# Patient Record
Sex: MALE | Race: WHITE | NOT HISPANIC OR LATINO | URBAN - METROPOLITAN AREA
[De-identification: names, ages, dates, MRNs, and addresses within clinical notes are randomized per-mention and may not be internally consistent; named-entity substitution may affect disease eponyms.]

---

## 2017-04-10 ENCOUNTER — OFFICE (OUTPATIENT)
Dept: URBAN - METROPOLITAN AREA CLINIC 10 | Facility: CLINIC | Age: 72
End: 2017-04-10
Payer: COMMERCIAL

## 2017-04-10 VITALS
SYSTOLIC BLOOD PRESSURE: 132 MMHG | HEART RATE: 86 BPM | WEIGHT: 229 LBS | HEIGHT: 74 IN | DIASTOLIC BLOOD PRESSURE: 76 MMHG

## 2017-04-10 DIAGNOSIS — K50.00 CROHN'S DISEASE OF SMALL INTESTINE WITHOUT COMPLICATIONS: ICD-10-CM

## 2017-04-10 DIAGNOSIS — Z86.010 PERSONAL HISTORY OF COLONIC POLYPS: ICD-10-CM

## 2017-04-10 DIAGNOSIS — K52.9 NONINFECTIVE GASTROENTERITIS AND COLITIS, UNSPECIFIED: ICD-10-CM

## 2017-04-10 PROCEDURE — G8427 DOCREV CUR MEDS BY ELIG CLIN: HCPCS | Performed by: INTERNAL MEDICINE

## 2017-04-10 PROCEDURE — 99213 OFFICE O/P EST LOW 20 MIN: CPT | Performed by: INTERNAL MEDICINE

## 2017-04-10 RX ORDER — COLESTIPOL HYDROCHLORIDE 1 G/1
2 TABLET ORAL
Qty: 60 | Refills: 11 | Status: COMPLETED
End: 2018-04-12

## 2017-04-11 LAB
ADALIMUMAB+AB (SERIAL MONITOR): ADALIMUMAB DRUG LEVEL: 0.6 UG/ML
ADALIMUMAB+AB (SERIAL MONITOR): ANTI-ADALIMUMAB ANTIBODY: <25 NG/ML

## 2017-04-14 LAB
C DIFFICLIE TOXIN, PCR: C DIFF TOXIN BY PCR: NOT DETECTED
C DIFFICLIE TOXIN, PCR: SOURCE: (no result)
C-REACTIVE PROTEIN: <0.5 MG/DL
CALPROTECTIN, FECAL: CALPROTECTIN,FECAL: 219 MCG/G — HIGH
CBC COMPLETE BLOOD COUNT W/O DIFF: HEMATOCRIT: 27.8 % — LOW (ref 39–55)
CBC COMPLETE BLOOD COUNT W/O DIFF: HEMOGLOBIN: 8.3 G/DL — LOW (ref 13–17.5)
CBC COMPLETE BLOOD COUNT W/O DIFF: MCH: 19.8 PG — LOW (ref 25–35)
CBC COMPLETE BLOOD COUNT W/O DIFF: MCHC: 29.9 % — LOW (ref 30–38)
CBC COMPLETE BLOOD COUNT W/O DIFF: MCV: 66.3 FL — CRITICAL LOW (ref 78–102)
CBC COMPLETE BLOOD COUNT W/O DIFF: PLATELET COUNT: 232 K/UL (ref 150–450)
CBC COMPLETE BLOOD COUNT W/O DIFF: RBC DISTRIBUTION WIDTH: 18.1 % — HIGH (ref 11.5–16)
CBC COMPLETE BLOOD COUNT W/O DIFF: RED BLOOD CELL COUNT: 4.19 M/UL — LOW (ref 4.3–5.7)
CBC COMPLETE BLOOD COUNT W/O DIFF: WHITE BLOOD CELL COUNT: 7.1 K/UL (ref 4–11)
COMPREHENSIVE METABOLIC PANEL: ALBUMIN: 3.7 G/DL (ref 3.5–5.2)
COMPREHENSIVE METABOLIC PANEL: ALKALINE PHOSPHATASE: 65 U/L (ref 46–118)
COMPREHENSIVE METABOLIC PANEL: CALCIUM TOTAL: 8.7 MG/DL (ref 8.5–10.5)
COMPREHENSIVE METABOLIC PANEL: CARBON DIOXIDE: 28 MEQ/L (ref 21–31)
COMPREHENSIVE METABOLIC PANEL: CHLORIDE: 103 MEQ/L (ref 96–106)
COMPREHENSIVE METABOLIC PANEL: CREATININE: 1.08 MG/DL (ref 0.8–1.4)
COMPREHENSIVE METABOLIC PANEL: FASTING/NON-FASTING: (no result)
COMPREHENSIVE METABOLIC PANEL: GLUCOSE: 92 MG/DL (ref 65–100)
COMPREHENSIVE METABOLIC PANEL: POTASSIUM: 3.7 MEQ/ML (ref 3.5–5.4)
COMPREHENSIVE METABOLIC PANEL: SGOT (AST): 18 U/L (ref 13–40)
COMPREHENSIVE METABOLIC PANEL: SGPT (ALT): 13 U/L (ref 7–52)
COMPREHENSIVE METABOLIC PANEL: SODIUM: 143 MEQ/L (ref 135–145)
COMPREHENSIVE METABOLIC PANEL: TOTAL BILIRUBIN: 0.5 MG/DL (ref 0.3–1.2)
COMPREHENSIVE METABOLIC PANEL: TOTAL PROTEIN: 5.9 G/DL — LOW (ref 6.4–8.3)
COMPREHENSIVE METABOLIC PANEL: UREA NITROGEN: 16 MG/DL (ref 8–23)
QUANTIFERON TB: MITOGEN-NIL: 6.73 IU/ML
QUANTIFERON TB: NIL: 0.01 IU/ML
QUANTIFERON TB: QUANTIFERON TB GOLD: NEGATIVE
QUANTIFERON TB: TB AG-NIL: 0.02 IU/ML

## 2017-06-12 ENCOUNTER — OFFICE (OUTPATIENT)
Dept: URBAN - METROPOLITAN AREA CLINIC 10 | Facility: CLINIC | Age: 72
End: 2017-06-12
Payer: COMMERCIAL

## 2017-06-12 VITALS
HEIGHT: 74 IN | WEIGHT: 233 LBS | DIASTOLIC BLOOD PRESSURE: 68 MMHG | HEART RATE: 86 BPM | SYSTOLIC BLOOD PRESSURE: 135 MMHG

## 2017-06-12 DIAGNOSIS — D50.9 IRON DEFICIENCY ANEMIA, UNSPECIFIED: ICD-10-CM

## 2017-06-12 DIAGNOSIS — K50.00 CROHN'S DISEASE OF SMALL INTESTINE WITHOUT COMPLICATIONS: ICD-10-CM

## 2017-06-12 LAB
CBC COMPLETE BLOOD COUNT W/O DIFF: HEMATOCRIT: 29.9 % — LOW (ref 39–55)
CBC COMPLETE BLOOD COUNT W/O DIFF: HEMOGLOBIN: 8.7 G/DL — LOW (ref 13–17.5)
CBC COMPLETE BLOOD COUNT W/O DIFF: MCH: 19.8 PG — LOW (ref 25–35)
CBC COMPLETE BLOOD COUNT W/O DIFF: MCHC: 29.1 % — LOW (ref 30–38)
CBC COMPLETE BLOOD COUNT W/O DIFF: MCV: 68.1 FL — CRITICAL LOW (ref 78–102)
CBC COMPLETE BLOOD COUNT W/O DIFF: PLATELET COUNT: 263 K/UL (ref 150–450)
CBC COMPLETE BLOOD COUNT W/O DIFF: RBC DISTRIBUTION WIDTH: 19.1 % — HIGH (ref 11.5–16)
CBC COMPLETE BLOOD COUNT W/O DIFF: RED BLOOD CELL COUNT: 4.39 M/UL (ref 4.3–5.7)
CBC COMPLETE BLOOD COUNT W/O DIFF: WHITE BLOOD CELL COUNT: 7.8 K/UL (ref 4–11)
FERRITIN: 7 NG/ML — LOW (ref 30–400)
IRON & TIBC: % SATURATION: 4 % — LOW (ref 20–50)
IRON & TIBC: IRON: 20 UG/DL — LOW (ref 59–158)
IRON & TIBC: TOTAL IRON BINDING: 446 UG/DL — HIGH (ref 112–347)

## 2017-06-12 PROCEDURE — G8427 DOCREV CUR MEDS BY ELIG CLIN: HCPCS | Performed by: INTERNAL MEDICINE

## 2017-06-12 PROCEDURE — 99213 OFFICE O/P EST LOW 20 MIN: CPT | Performed by: INTERNAL MEDICINE

## 2017-07-13 ENCOUNTER — AMBULATORY SURGICAL CENTER (OUTPATIENT)
Dept: URBAN - METROPOLITAN AREA SURGERY 1 | Facility: SURGERY | Age: 72
End: 2017-07-13
Payer: COMMERCIAL

## 2017-07-13 VITALS
HEART RATE: 82 BPM | TEMPERATURE: 97.6 F | HEART RATE: 76 BPM | DIASTOLIC BLOOD PRESSURE: 80 MMHG | OXYGEN SATURATION: 96 % | DIASTOLIC BLOOD PRESSURE: 69 MMHG | TEMPERATURE: 98.3 F | OXYGEN SATURATION: 97 % | DIASTOLIC BLOOD PRESSURE: 53 MMHG | SYSTOLIC BLOOD PRESSURE: 107 MMHG | OXYGEN SATURATION: 94 % | HEART RATE: 82 BPM | RESPIRATION RATE: 14 BRPM | HEART RATE: 74 BPM | HEART RATE: 85 BPM | DIASTOLIC BLOOD PRESSURE: 66 MMHG | SYSTOLIC BLOOD PRESSURE: 85 MMHG | HEIGHT: 74 IN | SYSTOLIC BLOOD PRESSURE: 109 MMHG | RESPIRATION RATE: 14 BRPM | HEART RATE: 73 BPM | OXYGEN SATURATION: 98 % | WEIGHT: 226 LBS | HEART RATE: 76 BPM | HEIGHT: 74 IN | SYSTOLIC BLOOD PRESSURE: 85 MMHG | OXYGEN SATURATION: 96 % | DIASTOLIC BLOOD PRESSURE: 80 MMHG | SYSTOLIC BLOOD PRESSURE: 128 MMHG | RESPIRATION RATE: 15 BRPM | RESPIRATION RATE: 13 BRPM | DIASTOLIC BLOOD PRESSURE: 69 MMHG | HEART RATE: 73 BPM | TEMPERATURE: 98.3 F | TEMPERATURE: 97.6 F | OXYGEN SATURATION: 98 % | DIASTOLIC BLOOD PRESSURE: 66 MMHG | RESPIRATION RATE: 18 BRPM | SYSTOLIC BLOOD PRESSURE: 112 MMHG | SYSTOLIC BLOOD PRESSURE: 128 MMHG | RESPIRATION RATE: 15 BRPM | SYSTOLIC BLOOD PRESSURE: 107 MMHG | HEART RATE: 85 BPM | OXYGEN SATURATION: 94 % | WEIGHT: 226 LBS | RESPIRATION RATE: 13 BRPM | RESPIRATION RATE: 18 BRPM | DIASTOLIC BLOOD PRESSURE: 53 MMHG | SYSTOLIC BLOOD PRESSURE: 112 MMHG | SYSTOLIC BLOOD PRESSURE: 109 MMHG | HEART RATE: 74 BPM | OXYGEN SATURATION: 97 %

## 2017-07-13 DIAGNOSIS — D50.9 IRON DEFICIENCY ANEMIA, UNSPECIFIED: ICD-10-CM

## 2017-07-13 PROCEDURE — 43235 EGD DIAGNOSTIC BRUSH WASH: CPT | Performed by: INTERNAL MEDICINE

## 2017-07-13 PROCEDURE — G8907 PT DOC NO EVENTS ON DISCHARG: HCPCS | Performed by: INTERNAL MEDICINE

## 2017-07-13 PROCEDURE — G8918 PT W/O PREOP ORDER IV AB PRO: HCPCS | Performed by: INTERNAL MEDICINE

## 2017-08-16 ENCOUNTER — AMBULATORY SURGICAL CENTER (OUTPATIENT)
Dept: URBAN - METROPOLITAN AREA SURGERY 1 | Facility: SURGERY | Age: 72
End: 2017-08-16

## 2017-08-16 ENCOUNTER — AMBULATORY SURGICAL CENTER (OUTPATIENT)
Dept: URBAN - METROPOLITAN AREA SURGERY 1 | Facility: SURGERY | Age: 72
End: 2017-08-16
Payer: COMMERCIAL

## 2017-08-16 ENCOUNTER — OFFICE (OUTPATIENT)
Dept: URBAN - METROPOLITAN AREA CLINIC 11 | Facility: CLINIC | Age: 72
End: 2017-08-16
Payer: COMMERCIAL

## 2017-08-16 VITALS
SYSTOLIC BLOOD PRESSURE: 118 MMHG | HEART RATE: 66 BPM | DIASTOLIC BLOOD PRESSURE: 51 MMHG | HEART RATE: 63 BPM | RESPIRATION RATE: 14 BRPM | SYSTOLIC BLOOD PRESSURE: 115 MMHG | HEART RATE: 60 BPM | WEIGHT: 231 LBS | HEIGHT: 74 IN | DIASTOLIC BLOOD PRESSURE: 38 MMHG | SYSTOLIC BLOOD PRESSURE: 118 MMHG | HEART RATE: 63 BPM | SYSTOLIC BLOOD PRESSURE: 98 MMHG | HEART RATE: 68 BPM | SYSTOLIC BLOOD PRESSURE: 113 MMHG | DIASTOLIC BLOOD PRESSURE: 38 MMHG | SYSTOLIC BLOOD PRESSURE: 107 MMHG | SYSTOLIC BLOOD PRESSURE: 99 MMHG | RESPIRATION RATE: 16 BRPM | DIASTOLIC BLOOD PRESSURE: 66 MMHG | TEMPERATURE: 98.5 F | DIASTOLIC BLOOD PRESSURE: 73 MMHG | HEART RATE: 72 BPM | DIASTOLIC BLOOD PRESSURE: 67 MMHG | DIASTOLIC BLOOD PRESSURE: 63 MMHG | DIASTOLIC BLOOD PRESSURE: 66 MMHG | SYSTOLIC BLOOD PRESSURE: 107 MMHG | SYSTOLIC BLOOD PRESSURE: 90 MMHG | RESPIRATION RATE: 14 BRPM | DIASTOLIC BLOOD PRESSURE: 50 MMHG | HEART RATE: 65 BPM | OXYGEN SATURATION: 96 % | DIASTOLIC BLOOD PRESSURE: 63 MMHG | DIASTOLIC BLOOD PRESSURE: 51 MMHG | OXYGEN SATURATION: 97 % | SYSTOLIC BLOOD PRESSURE: 99 MMHG | RESPIRATION RATE: 16 BRPM | HEART RATE: 72 BPM | SYSTOLIC BLOOD PRESSURE: 90 MMHG | HEART RATE: 60 BPM | SYSTOLIC BLOOD PRESSURE: 115 MMHG | OXYGEN SATURATION: 95 % | OXYGEN SATURATION: 97 % | DIASTOLIC BLOOD PRESSURE: 67 MMHG | TEMPERATURE: 98.5 F | DIASTOLIC BLOOD PRESSURE: 50 MMHG | SYSTOLIC BLOOD PRESSURE: 98 MMHG | TEMPERATURE: 97.6 F | HEART RATE: 65 BPM | HEIGHT: 74 IN | WEIGHT: 231 LBS | OXYGEN SATURATION: 96 % | HEART RATE: 87 BPM | SYSTOLIC BLOOD PRESSURE: 113 MMHG | TEMPERATURE: 97.6 F | DIASTOLIC BLOOD PRESSURE: 73 MMHG | HEART RATE: 87 BPM | HEART RATE: 66 BPM | HEART RATE: 68 BPM | OXYGEN SATURATION: 95 %

## 2017-08-16 DIAGNOSIS — K51.40 INFLAMMATORY POLYPS OF COLON WITHOUT COMPLICATIONS: ICD-10-CM

## 2017-08-16 DIAGNOSIS — K92.2 GASTROINTESTINAL HEMORRHAGE, UNSPECIFIED: ICD-10-CM

## 2017-08-16 DIAGNOSIS — K57.30 DIVERTICULOSIS OF LARGE INTESTINE WITHOUT PERFORATION OR ABS: ICD-10-CM

## 2017-08-16 DIAGNOSIS — K50.00 CROHN'S DISEASE OF SMALL INTESTINE WITHOUT COMPLICATIONS: ICD-10-CM

## 2017-08-16 DIAGNOSIS — K56.69 OTHER INTESTINAL OBSTRUCTION: ICD-10-CM

## 2017-08-16 PROCEDURE — 45380 COLONOSCOPY AND BIOPSY: CPT | Performed by: INTERNAL MEDICINE

## 2017-08-16 PROCEDURE — G8907 PT DOC NO EVENTS ON DISCHARG: HCPCS | Performed by: INTERNAL MEDICINE

## 2017-08-16 PROCEDURE — 88342 IMHCHEM/IMCYTCHM 1ST ANTB: CPT | Performed by: INTERNAL MEDICINE

## 2017-08-16 PROCEDURE — G8918 PT W/O PREOP ORDER IV AB PRO: HCPCS | Performed by: INTERNAL MEDICINE

## 2017-08-16 PROCEDURE — 88341 IMHCHEM/IMCYTCHM EA ADD ANTB: CPT | Performed by: INTERNAL MEDICINE

## 2017-08-16 PROCEDURE — 88305 TISSUE EXAM BY PATHOLOGIST: CPT | Performed by: INTERNAL MEDICINE

## 2017-08-16 NOTE — SERVICENOTES
Alerts:
 CO2 insufflation used

Intra-Procedure Room:
 Procedure Room 2

Post-Procedure Room:
 8

Pre-Procedure Room:
 8

## 2017-09-07 ENCOUNTER — OFFICE (OUTPATIENT)
Dept: URBAN - METROPOLITAN AREA CLINIC 10 | Facility: CLINIC | Age: 72
End: 2017-09-07

## 2017-11-06 ENCOUNTER — OFFICE (OUTPATIENT)
Dept: URBAN - METROPOLITAN AREA CLINIC 10 | Facility: CLINIC | Age: 72
End: 2017-11-06

## 2017-11-06 VITALS
HEIGHT: 74 IN | HEART RATE: 90 BPM | WEIGHT: 232 LBS | SYSTOLIC BLOOD PRESSURE: 116 MMHG | DIASTOLIC BLOOD PRESSURE: 64 MMHG

## 2017-11-06 DIAGNOSIS — K50.00 CROHN'S DISEASE OF SMALL INTESTINE WITHOUT COMPLICATIONS: ICD-10-CM

## 2017-11-06 PROCEDURE — 99213 OFFICE O/P EST LOW 20 MIN: CPT | Performed by: INTERNAL MEDICINE

## 2017-11-06 PROCEDURE — G8427 DOCREV CUR MEDS BY ELIG CLIN: HCPCS | Performed by: INTERNAL MEDICINE

## 2017-11-08 ENCOUNTER — OFFICE (OUTPATIENT)
Dept: URBAN - METROPOLITAN AREA CLINIC 10 | Facility: CLINIC | Age: 72
End: 2017-11-08

## 2017-11-08 LAB — CALPROTECTIN, FECAL: 210 UG/G — HIGH (ref 0–120)

## 2018-04-12 ENCOUNTER — OFFICE (OUTPATIENT)
Dept: URBAN - METROPOLITAN AREA CLINIC 10 | Facility: CLINIC | Age: 73
End: 2018-04-12

## 2018-04-12 VITALS
HEIGHT: 74 IN | DIASTOLIC BLOOD PRESSURE: 62 MMHG | WEIGHT: 238 LBS | HEART RATE: 88 BPM | SYSTOLIC BLOOD PRESSURE: 123 MMHG

## 2018-04-12 DIAGNOSIS — K50.00 CROHN'S DISEASE OF SMALL INTESTINE WITHOUT COMPLICATIONS: ICD-10-CM

## 2018-04-12 DIAGNOSIS — D50.9 IRON DEFICIENCY ANEMIA, UNSPECIFIED: ICD-10-CM

## 2018-04-12 LAB
C-REACTIVE PROTEIN, QUANT: 0.5 MG/L (ref 0–4.9)
CBC, PLATELET, NO DIFFERENTIAL: HEMATOCRIT: 26.9 % — LOW (ref 37.5–51)
CBC, PLATELET, NO DIFFERENTIAL: HEMOGLOBIN: 7.7 G/DL — LOW (ref 13–17.7)
CBC, PLATELET, NO DIFFERENTIAL: MCH: 19.5 PG — LOW (ref 26.6–33)
CBC, PLATELET, NO DIFFERENTIAL: MCHC: 28.6 G/DL — LOW (ref 31.5–35.7)
CBC, PLATELET, NO DIFFERENTIAL: MCV: 68 FL — LOW (ref 79–97)
CBC, PLATELET, NO DIFFERENTIAL: PLATELETS: 197 X10E3/UL (ref 150–379)
CBC, PLATELET, NO DIFFERENTIAL: RBC: 3.94 X10E6/UL — LOW (ref 4.14–5.8)
CBC, PLATELET, NO DIFFERENTIAL: RDW: 16.3 % — HIGH (ref 12.3–15.4)
CBC, PLATELET, NO DIFFERENTIAL: WBC: 8.3 X10E3/UL (ref 3.4–10.8)

## 2018-04-12 PROCEDURE — 99213 OFFICE O/P EST LOW 20 MIN: CPT | Performed by: INTERNAL MEDICINE

## 2018-05-18 ENCOUNTER — OFFICE (OUTPATIENT)
Dept: URBAN - METROPOLITAN AREA CLINIC 10 | Facility: CLINIC | Age: 73
End: 2018-05-18

## 2018-10-12 ENCOUNTER — OFFICE (OUTPATIENT)
Dept: URBAN - METROPOLITAN AREA CLINIC 10 | Facility: CLINIC | Age: 73
End: 2018-10-12

## 2018-10-12 VITALS
DIASTOLIC BLOOD PRESSURE: 89 MMHG | SYSTOLIC BLOOD PRESSURE: 137 MMHG | WEIGHT: 236 LBS | HEART RATE: 89 BPM | HEIGHT: 74 IN

## 2018-10-12 DIAGNOSIS — D50.9 IRON DEFICIENCY ANEMIA, UNSPECIFIED: ICD-10-CM

## 2018-10-12 DIAGNOSIS — K50.00 CROHN'S DISEASE OF SMALL INTESTINE WITHOUT COMPLICATIONS: ICD-10-CM

## 2018-10-12 DIAGNOSIS — Z79.01 LONG TERM (CURRENT) USE OF ANTICOAGULANTS: ICD-10-CM

## 2018-10-12 PROCEDURE — 99213 OFFICE O/P EST LOW 20 MIN: CPT | Performed by: INTERNAL MEDICINE

## 2020-02-24 ENCOUNTER — OFFICE (OUTPATIENT)
Dept: URBAN - METROPOLITAN AREA CLINIC 10 | Facility: CLINIC | Age: 75
End: 2020-02-24

## 2020-02-24 VITALS
SYSTOLIC BLOOD PRESSURE: 108 MMHG | DIASTOLIC BLOOD PRESSURE: 72 MMHG | HEART RATE: 89 BPM | WEIGHT: 215 LBS | HEIGHT: 74 IN

## 2020-02-24 DIAGNOSIS — R10.9 UNSPECIFIED ABDOMINAL PAIN: ICD-10-CM

## 2020-02-24 DIAGNOSIS — K50.90 CROHN'S DISEASE, UNSPECIFIED, WITHOUT COMPLICATIONS: ICD-10-CM

## 2020-02-24 LAB
C-REACTIVE PROTEIN, QUANT: 2 MG/L (ref 0–10)
COMP. METABOLIC PANEL (14): A/G RATIO: 1.5 (ref 1.2–2.2)
COMP. METABOLIC PANEL (14): ALBUMIN: 3.5 G/DL — LOW (ref 3.7–4.7)
COMP. METABOLIC PANEL (14): ALKALINE PHOSPHATASE: 87 IU/L (ref 39–117)
COMP. METABOLIC PANEL (14): ALT (SGPT): 12 IU/L (ref 0–44)
COMP. METABOLIC PANEL (14): AST (SGOT): 15 IU/L (ref 0–40)
COMP. METABOLIC PANEL (14): BILIRUBIN, TOTAL: 0.7 MG/DL (ref 0–1.2)
COMP. METABOLIC PANEL (14): BUN/CREATININE RATIO: 15 (ref 10–24)
COMP. METABOLIC PANEL (14): BUN: 15 MG/DL (ref 8–27)
COMP. METABOLIC PANEL (14): CALCIUM: 9.3 MG/DL (ref 8.6–10.2)
COMP. METABOLIC PANEL (14): CARBON DIOXIDE, TOTAL: 23 MMOL/L (ref 20–29)
COMP. METABOLIC PANEL (14): CHLORIDE: 104 MMOL/L (ref 96–106)
COMP. METABOLIC PANEL (14): CREATININE: 1.02 MG/DL (ref 0.76–1.27)
COMP. METABOLIC PANEL (14): EGFR IF AFRICN AM: 83 ML/MIN/1.73 (ref 59–?)
COMP. METABOLIC PANEL (14): EGFR IF NONAFRICN AM: 72 ML/MIN/1.73 (ref 59–?)
COMP. METABOLIC PANEL (14): GLOBULIN, TOTAL: 2.3 G/DL (ref 1.5–4.5)
COMP. METABOLIC PANEL (14): GLUCOSE: 85 MG/DL (ref 65–99)
COMP. METABOLIC PANEL (14): POTASSIUM: 4 MMOL/L (ref 3.5–5.2)
COMP. METABOLIC PANEL (14): PROTEIN, TOTAL: 5.8 G/DL — LOW (ref 6–8.5)
COMP. METABOLIC PANEL (14): SODIUM: 145 MMOL/L — HIGH (ref 134–144)
HBSAG SCREEN: NEGATIVE
QUANTIFERON-TB GOLD PLUS: NEGATIVE
QUANTIFERON-TB GOLD PLUS: QUANTIFERON CRITERIA: (no result)
QUANTIFERON-TB GOLD PLUS: QUANTIFERON INCUBATION: (no result)
QUANTIFERON-TB GOLD PLUS: QUANTIFERON MITOGEN VALUE: 6.23 IU/ML
QUANTIFERON-TB GOLD PLUS: QUANTIFERON NIL VALUE: 0.02 IU/ML
QUANTIFERON-TB GOLD PLUS: QUANTIFERON TB1 AG VALUE: 0.02 IU/ML
QUANTIFERON-TB GOLD PLUS: QUANTIFERON TB2 AG VALUE: 0.02 IU/ML

## 2020-02-24 PROCEDURE — 99213 OFFICE O/P EST LOW 20 MIN: CPT | Performed by: INTERNAL MEDICINE

## 2020-03-19 ENCOUNTER — OFFICE (OUTPATIENT)
Dept: URBAN - METROPOLITAN AREA CLINIC 11 | Facility: CLINIC | Age: 75
End: 2020-03-19

## 2020-03-19 DIAGNOSIS — K50.90 CROHN'S DISEASE, UNSPECIFIED, WITHOUT COMPLICATIONS: ICD-10-CM

## 2020-03-19 PROCEDURE — 96365 THER/PROPH/DIAG IV INF INIT: CPT | Performed by: INTERNAL MEDICINE

## 2020-05-11 ENCOUNTER — OFFICE (OUTPATIENT)
Dept: URBAN - METROPOLITAN AREA CLINIC 11 | Facility: CLINIC | Age: 75
End: 2020-05-11

## 2020-05-11 DIAGNOSIS — K50.90 CROHN'S DISEASE, UNSPECIFIED, WITHOUT COMPLICATIONS: ICD-10-CM

## 2020-05-11 PROCEDURE — 96401 CHEMO ANTI-NEOPL SQ/IM: CPT | Performed by: INTERNAL MEDICINE

## 2020-07-09 ENCOUNTER — OFFICE (OUTPATIENT)
Dept: URBAN - METROPOLITAN AREA CLINIC 11 | Facility: CLINIC | Age: 75
End: 2020-07-09

## 2020-07-09 DIAGNOSIS — K50.90 CROHN'S DISEASE, UNSPECIFIED, WITHOUT COMPLICATIONS: ICD-10-CM

## 2020-07-09 PROCEDURE — 96401 CHEMO ANTI-NEOPL SQ/IM: CPT | Performed by: INTERNAL MEDICINE

## 2020-09-14 ENCOUNTER — OFFICE (OUTPATIENT)
Dept: URBAN - METROPOLITAN AREA CLINIC 11 | Facility: CLINIC | Age: 75
End: 2020-09-14

## 2020-09-14 DIAGNOSIS — K50.90 CROHN'S DISEASE, UNSPECIFIED, WITHOUT COMPLICATIONS: ICD-10-CM

## 2020-09-14 PROCEDURE — 96401 CHEMO ANTI-NEOPL SQ/IM: CPT | Performed by: INTERNAL MEDICINE

## 2020-11-19 ENCOUNTER — OFFICE (OUTPATIENT)
Dept: URBAN - METROPOLITAN AREA CLINIC 11 | Facility: CLINIC | Age: 75
End: 2020-11-19

## 2020-11-19 DIAGNOSIS — K50.90 CROHN'S DISEASE, UNSPECIFIED, WITHOUT COMPLICATIONS: ICD-10-CM

## 2020-11-19 PROCEDURE — 96401 CHEMO ANTI-NEOPL SQ/IM: CPT | Performed by: INTERNAL MEDICINE

## 2021-01-22 ENCOUNTER — OFFICE (OUTPATIENT)
Dept: URBAN - METROPOLITAN AREA CLINIC 10 | Facility: CLINIC | Age: 76
End: 2021-01-22

## 2021-01-22 VITALS — HEIGHT: 74 IN

## 2021-01-22 DIAGNOSIS — K50.90 CROHN'S DISEASE, UNSPECIFIED, WITHOUT COMPLICATIONS: ICD-10-CM

## 2021-01-22 PROCEDURE — 96372 THER/PROPH/DIAG INJ SC/IM: CPT | Performed by: INTERNAL MEDICINE

## 2021-01-27 ENCOUNTER — OFFICE (OUTPATIENT)
Dept: URBAN - METROPOLITAN AREA CLINIC 10 | Facility: CLINIC | Age: 76
End: 2021-01-27

## 2021-01-27 VITALS
SYSTOLIC BLOOD PRESSURE: 145 MMHG | DIASTOLIC BLOOD PRESSURE: 85 MMHG | HEART RATE: 93 BPM | HEIGHT: 74 IN | WEIGHT: 223 LBS

## 2021-01-27 DIAGNOSIS — K50.90 CROHN'S DISEASE, UNSPECIFIED, WITHOUT COMPLICATIONS: ICD-10-CM

## 2021-01-27 DIAGNOSIS — R10.9 UNSPECIFIED ABDOMINAL PAIN: ICD-10-CM

## 2021-01-27 DIAGNOSIS — D50.9 IRON DEFICIENCY ANEMIA, UNSPECIFIED: ICD-10-CM

## 2021-01-27 DIAGNOSIS — R12 HEARTBURN: ICD-10-CM

## 2021-01-27 LAB
C-REACTIVE PROTEIN, QUANT: <1 MG/L
HBSAG SCREEN: NEGATIVE
QUANTIFERON-TB GOLD PLUS: NEGATIVE
QUANTIFERON-TB GOLD PLUS: QUANTIFERON CRITERIA: (no result)
QUANTIFERON-TB GOLD PLUS: QUANTIFERON INCUBATION: (no result)
QUANTIFERON-TB GOLD PLUS: QUANTIFERON MITOGEN VALUE: 0.94 IU/ML
QUANTIFERON-TB GOLD PLUS: QUANTIFERON NIL VALUE: 0.05 IU/ML
QUANTIFERON-TB GOLD PLUS: QUANTIFERON TB1 AG VALUE: 0.05 IU/ML
QUANTIFERON-TB GOLD PLUS: QUANTIFERON TB2 AG VALUE: 0.06 IU/ML

## 2021-01-27 PROCEDURE — 99214 OFFICE O/P EST MOD 30 MIN: CPT | Performed by: PHYSICIAN ASSISTANT

## 2021-01-27 RX ORDER — POLYETHYLENE GLYCOL 3350, SODIUM SULFATE, SODIUM CHLORIDE, POTASSIUM CHLORIDE, ASCORBIC ACID, SODIUM ASCORBATE 140-9-5.2G
KIT ORAL
Qty: 1 | Refills: 0 | Status: COMPLETED
Start: 2021-01-27 | End: 2023-06-01

## 2021-03-25 ENCOUNTER — OFFICE (OUTPATIENT)
Dept: URBAN - METROPOLITAN AREA CLINIC 10 | Facility: CLINIC | Age: 76
End: 2021-03-25

## 2021-03-25 DIAGNOSIS — K50.90 CROHN'S DISEASE, UNSPECIFIED, WITHOUT COMPLICATIONS: ICD-10-CM

## 2021-03-25 PROCEDURE — 96372 THER/PROPH/DIAG INJ SC/IM: CPT | Performed by: INTERNAL MEDICINE

## 2021-06-01 ENCOUNTER — OFFICE (OUTPATIENT)
Dept: URBAN - METROPOLITAN AREA CLINIC 10 | Facility: CLINIC | Age: 76
End: 2021-06-01

## 2021-06-01 DIAGNOSIS — K50.90 CROHN'S DISEASE, UNSPECIFIED, WITHOUT COMPLICATIONS: ICD-10-CM

## 2021-06-01 PROCEDURE — 96372 THER/PROPH/DIAG INJ SC/IM: CPT | Performed by: INTERNAL MEDICINE

## 2021-08-03 ENCOUNTER — OFFICE (OUTPATIENT)
Dept: URBAN - METROPOLITAN AREA CLINIC 10 | Facility: CLINIC | Age: 76
End: 2021-08-03
Payer: COMMERCIAL

## 2021-08-03 VITALS — HEIGHT: 74 IN

## 2021-08-03 DIAGNOSIS — K50.90 CROHN'S DISEASE, UNSPECIFIED, WITHOUT COMPLICATIONS: ICD-10-CM

## 2021-08-03 PROCEDURE — 96372 THER/PROPH/DIAG INJ SC/IM: CPT | Performed by: INTERNAL MEDICINE

## 2021-10-06 ENCOUNTER — OFFICE (OUTPATIENT)
Dept: URBAN - METROPOLITAN AREA CLINIC 10 | Facility: CLINIC | Age: 76
End: 2021-10-06
Payer: COMMERCIAL

## 2021-10-06 DIAGNOSIS — K50.90 CROHN'S DISEASE, UNSPECIFIED, WITHOUT COMPLICATIONS: ICD-10-CM

## 2021-10-06 PROCEDURE — 96372 THER/PROPH/DIAG INJ SC/IM: CPT | Performed by: INTERNAL MEDICINE

## 2023-06-01 ENCOUNTER — OFFICE (OUTPATIENT)
Dept: URBAN - METROPOLITAN AREA CLINIC 10 | Facility: CLINIC | Age: 78
End: 2023-06-01
Payer: COMMERCIAL

## 2023-06-01 VITALS
HEART RATE: 89 BPM | SYSTOLIC BLOOD PRESSURE: 132 MMHG | WEIGHT: 211 LBS | DIASTOLIC BLOOD PRESSURE: 78 MMHG | HEIGHT: 73 IN | OXYGEN SATURATION: 96 %

## 2023-06-01 DIAGNOSIS — K50.90 CROHN'S DISEASE, UNSPECIFIED, WITHOUT COMPLICATIONS: ICD-10-CM

## 2023-06-01 PROCEDURE — 99214 OFFICE O/P EST MOD 30 MIN: CPT | Performed by: INTERNAL MEDICINE

## 2023-06-01 RX ORDER — POLYETHYLENE GLYCOL 3350, SODIUM SULFATE, SODIUM CHLORIDE, POTASSIUM CHLORIDE, ASCORBIC ACID, SODIUM ASCORBATE 140-9-5.2G
KIT ORAL
Qty: 1 | Refills: 0 | Status: ACTIVE
Start: 2023-06-01

## 2023-06-03 LAB
CBC, PLATELET, NO DIFFERENTIAL: HEMATOCRIT: 37.9 % (ref 37.5–51)
CBC, PLATELET, NO DIFFERENTIAL: HEMOGLOBIN: 12 G/DL — LOW (ref 13–17.7)
CBC, PLATELET, NO DIFFERENTIAL: MCH: 28.4 PG (ref 26.6–33)
CBC, PLATELET, NO DIFFERENTIAL: MCHC: 31.7 G/DL (ref 31.5–35.7)
CBC, PLATELET, NO DIFFERENTIAL: MCV: 90 FL (ref 79–97)
CBC, PLATELET, NO DIFFERENTIAL: PLATELETS: 171 X10E3/UL (ref 150–450)
CBC, PLATELET, NO DIFFERENTIAL: RBC: 4.22 X10E6/UL (ref 4.14–5.8)
CBC, PLATELET, NO DIFFERENTIAL: RDW: 13.8 % (ref 11.6–15.4)
CBC, PLATELET, NO DIFFERENTIAL: WBC: 9.2 X10E3/UL (ref 3.4–10.8)
COMP. METABOLIC PANEL (14): A/G RATIO: 1.6 (ref 1.2–2.2)
COMP. METABOLIC PANEL (14): ALBUMIN: 3.5 G/DL — LOW (ref 3.7–4.7)
COMP. METABOLIC PANEL (14): ALKALINE PHOSPHATASE: 77 IU/L (ref 44–121)
COMP. METABOLIC PANEL (14): ALT (SGPT): 26 IU/L (ref 0–44)
COMP. METABOLIC PANEL (14): AST (SGOT): 27 IU/L (ref 0–40)
COMP. METABOLIC PANEL (14): BILIRUBIN, TOTAL: 1 MG/DL (ref 0–1.2)
COMP. METABOLIC PANEL (14): BUN/CREATININE RATIO: 13 (ref 10–24)
COMP. METABOLIC PANEL (14): BUN: 13 MG/DL (ref 8–27)
COMP. METABOLIC PANEL (14): CALCIUM: 9 MG/DL (ref 8.6–10.2)
COMP. METABOLIC PANEL (14): CARBON DIOXIDE, TOTAL: 25 MMOL/L (ref 20–29)
COMP. METABOLIC PANEL (14): CHLORIDE: 103 MMOL/L (ref 96–106)
COMP. METABOLIC PANEL (14): CREATININE: 1.01 MG/DL (ref 0.76–1.27)
COMP. METABOLIC PANEL (14): EGFR: 76 ML/MIN/1.73 (ref 59–?)
COMP. METABOLIC PANEL (14): GLOBULIN, TOTAL: 2.2 G/DL (ref 1.5–4.5)
COMP. METABOLIC PANEL (14): GLUCOSE: 85 MG/DL (ref 70–99)
COMP. METABOLIC PANEL (14): POTASSIUM: 4 MMOL/L (ref 3.5–5.2)
COMP. METABOLIC PANEL (14): PROTEIN, TOTAL: 5.7 G/DL — LOW (ref 6–8.5)
COMP. METABOLIC PANEL (14): SODIUM: 142 MMOL/L (ref 134–144)
QUANTIFERON-TB GOLD PLUS: NEGATIVE
QUANTIFERON-TB GOLD PLUS: QUANTIFERON CRITERIA: (no result)
QUANTIFERON-TB GOLD PLUS: QUANTIFERON INCUBATION: (no result)
QUANTIFERON-TB GOLD PLUS: QUANTIFERON MITOGEN VALUE: >10 IU/ML
QUANTIFERON-TB GOLD PLUS: QUANTIFERON NIL VALUE: 0 IU/ML
QUANTIFERON-TB GOLD PLUS: QUANTIFERON TB1 AG VALUE: 0 IU/ML
QUANTIFERON-TB GOLD PLUS: QUANTIFERON TB2 AG VALUE: 0 IU/ML

## 2025-04-02 ENCOUNTER — OFFICE (OUTPATIENT)
Dept: URBAN - METROPOLITAN AREA CLINIC 10 | Facility: CLINIC | Age: 80
End: 2025-04-02
Payer: MEDICARE

## 2025-04-02 VITALS
HEIGHT: 73 IN | HEART RATE: 103 BPM | DIASTOLIC BLOOD PRESSURE: 66 MMHG | OXYGEN SATURATION: 96 % | SYSTOLIC BLOOD PRESSURE: 123 MMHG | WEIGHT: 231 LBS

## 2025-04-02 DIAGNOSIS — Z86.0100 PERSONAL HISTORY OF COLON POLYPS, UNSPECIFIED: ICD-10-CM

## 2025-04-02 DIAGNOSIS — K50.00 CROHN'S DISEASE OF SMALL INTESTINE WITHOUT COMPLICATIONS: ICD-10-CM

## 2025-04-02 PROCEDURE — 99214 OFFICE O/P EST MOD 30 MIN: CPT | Performed by: PHYSICIAN ASSISTANT

## 2025-04-04 LAB
C-REACTIVE PROTEIN, QUANT: 1 MG/L (ref 0–10)
CBC WITH DIFFERENTIAL/PLATELET: BASO (ABSOLUTE): 0.1 X10E3/UL (ref 0–0.2)
CBC WITH DIFFERENTIAL/PLATELET: BASOS: 1 %
CBC WITH DIFFERENTIAL/PLATELET: EOS (ABSOLUTE): 0.1 X10E3/UL (ref 0–0.4)
CBC WITH DIFFERENTIAL/PLATELET: EOS: 1 %
CBC WITH DIFFERENTIAL/PLATELET: HEMATOCRIT: 38.2 % (ref 37.5–51)
CBC WITH DIFFERENTIAL/PLATELET: HEMOGLOBIN: 11.7 G/DL — LOW (ref 13–17.7)
CBC WITH DIFFERENTIAL/PLATELET: IMMATURE GRANS (ABS): 0 X10E3/UL (ref 0–0.1)
CBC WITH DIFFERENTIAL/PLATELET: IMMATURE GRANULOCYTES: 0 %
CBC WITH DIFFERENTIAL/PLATELET: LYMPHS (ABSOLUTE): 0.7 X10E3/UL (ref 0.7–3.1)
CBC WITH DIFFERENTIAL/PLATELET: LYMPHS: 7 %
CBC WITH DIFFERENTIAL/PLATELET: MCH: 28.1 PG (ref 26.6–33)
CBC WITH DIFFERENTIAL/PLATELET: MCHC: 30.6 G/DL — LOW (ref 31.5–35.7)
CBC WITH DIFFERENTIAL/PLATELET: MCV: 92 FL (ref 79–97)
CBC WITH DIFFERENTIAL/PLATELET: MONOCYTES(ABSOLUTE): 0.7 X10E3/UL (ref 0.1–0.9)
CBC WITH DIFFERENTIAL/PLATELET: MONOCYTES: 7 %
CBC WITH DIFFERENTIAL/PLATELET: NEUTROPHILS (ABSOLUTE): 8.6 X10E3/UL — HIGH (ref 1.4–7)
CBC WITH DIFFERENTIAL/PLATELET: NEUTROPHILS: 84 %
CBC WITH DIFFERENTIAL/PLATELET: PLATELETS: 254 X10E3/UL (ref 150–450)
CBC WITH DIFFERENTIAL/PLATELET: RBC: 4.17 X10E6/UL (ref 4.14–5.8)
CBC WITH DIFFERENTIAL/PLATELET: RDW: 19.6 % — HIGH (ref 11.6–15.4)
CBC WITH DIFFERENTIAL/PLATELET: WBC: 10.2 X10E3/UL (ref 3.4–10.8)
COMP. METABOLIC PANEL (14): ALBUMIN: 3.1 G/DL — LOW (ref 3.8–4.8)
COMP. METABOLIC PANEL (14): ALKALINE PHOSPHATASE: 96 IU/L (ref 44–121)
COMP. METABOLIC PANEL (14): ALT (SGPT): 20 IU/L (ref 0–44)
COMP. METABOLIC PANEL (14): AST (SGOT): 22 IU/L (ref 0–40)
COMP. METABOLIC PANEL (14): BILIRUBIN, TOTAL: 0.4 MG/DL (ref 0–1.2)
COMP. METABOLIC PANEL (14): BUN/CREATININE RATIO: 9 — LOW (ref 10–24)
COMP. METABOLIC PANEL (14): BUN: 12 MG/DL (ref 8–27)
COMP. METABOLIC PANEL (14): CALCIUM: 8.9 MG/DL (ref 8.6–10.2)
COMP. METABOLIC PANEL (14): CARBON DIOXIDE, TOTAL: 26 MMOL/L (ref 20–29)
COMP. METABOLIC PANEL (14): CHLORIDE: 105 MMOL/L (ref 96–106)
COMP. METABOLIC PANEL (14): CREATININE: 1.36 MG/DL — HIGH (ref 0.76–1.27)
COMP. METABOLIC PANEL (14): EGFR: 53 ML/MIN/1.73 — LOW (ref 59–?)
COMP. METABOLIC PANEL (14): GLOBULIN, TOTAL: 1.9 G/DL (ref 1.5–4.5)
COMP. METABOLIC PANEL (14): GLUCOSE: 84 MG/DL (ref 70–99)
COMP. METABOLIC PANEL (14): POTASSIUM: 4.2 MMOL/L (ref 3.5–5.2)
COMP. METABOLIC PANEL (14): PROTEIN, TOTAL: 5 G/DL — LOW (ref 6–8.5)
COMP. METABOLIC PANEL (14): SODIUM: 142 MMOL/L (ref 134–144)
QUANTIFERON-TB GOLD PLUS: ABNORMAL
QUANTIFERON-TB GOLD PLUS: QUANTIFERON CRITERIA: (no result)
QUANTIFERON-TB GOLD PLUS: QUANTIFERON INCUBATION: (no result)
QUANTIFERON-TB GOLD PLUS: QUANTIFERON MITOGEN VALUE: 0.1 IU/ML
QUANTIFERON-TB GOLD PLUS: QUANTIFERON NIL VALUE: 0.01 IU/ML
QUANTIFERON-TB GOLD PLUS: QUANTIFERON TB1 AG VALUE: 0.01 IU/ML
QUANTIFERON-TB GOLD PLUS: QUANTIFERON TB2 AG VALUE: 0.01 IU/ML